# Patient Record
Sex: MALE | Race: WHITE | NOT HISPANIC OR LATINO
[De-identification: names, ages, dates, MRNs, and addresses within clinical notes are randomized per-mention and may not be internally consistent; named-entity substitution may affect disease eponyms.]

---

## 2023-07-28 PROBLEM — Z00.00 ENCOUNTER FOR PREVENTIVE HEALTH EXAMINATION: Status: ACTIVE | Noted: 2023-07-28

## 2023-09-07 ENCOUNTER — TRANSCRIPTION ENCOUNTER (OUTPATIENT)
Age: 83
End: 2023-09-07

## 2023-09-07 ENCOUNTER — APPOINTMENT (OUTPATIENT)
Dept: GASTROENTEROLOGY | Facility: CLINIC | Age: 83
End: 2023-09-07
Payer: MEDICARE

## 2023-09-07 VITALS
BODY MASS INDEX: 21.87 KG/M2 | WEIGHT: 189 LBS | DIASTOLIC BLOOD PRESSURE: 70 MMHG | HEIGHT: 78 IN | SYSTOLIC BLOOD PRESSURE: 120 MMHG

## 2023-09-07 DIAGNOSIS — K62.5 HEMORRHAGE OF ANUS AND RECTUM: ICD-10-CM

## 2023-09-07 PROCEDURE — 99203 OFFICE O/P NEW LOW 30 MIN: CPT

## 2023-09-07 RX ORDER — METOPROLOL SUCCINATE 50 MG/1
50 TABLET, EXTENDED RELEASE ORAL
Refills: 0 | Status: ACTIVE | COMMUNITY

## 2023-09-07 RX ORDER — FUROSEMIDE 40 MG/1
40 TABLET ORAL
Refills: 0 | Status: ACTIVE | COMMUNITY

## 2023-09-07 RX ORDER — DOCUSATE SODIUM 100 MG/1
100 CAPSULE, LIQUID FILLED ORAL
Refills: 0 | Status: ACTIVE | COMMUNITY

## 2023-09-07 RX ORDER — PANTOPRAZOLE SODIUM 40 MG/1
40 GRANULE, DELAYED RELEASE ORAL
Refills: 0 | Status: ACTIVE | COMMUNITY

## 2023-09-07 RX ORDER — FINASTERIDE 5 MG/1
5 TABLET, FILM COATED ORAL
Refills: 0 | Status: ACTIVE | COMMUNITY

## 2023-09-07 RX ORDER — ROSUVASTATIN CALCIUM 5 MG/1
5 TABLET, FILM COATED ORAL
Refills: 0 | Status: ACTIVE | COMMUNITY

## 2023-09-07 NOTE — ASSESSMENT
[FreeTextEntry1] : 84 yo male with history of presumed diverticular bleeding.  Last colonoscopy was in 2017.  Arrange colonoscopy at [facility] using [employer] clean out.

## 2023-09-07 NOTE — HISTORY OF PRESENT ILLNESS
[FreeTextEntry1] : Mr. JUSTO ALEJANDRA is a 83 year old male with history of recurrent bouts of painless rectal bleeding.  Patient was evaluated in 2017 after being on Xarelto for DVT.  Patient underwent urgent colonoscopy which demonstrated diverticular disease.  Since that time patient has had three subsequent episodes of painless bleeding.  Most recent blood counts are essentially unremarkable.  Symptoms resolved spontaneously.  Patient is currently off Xarelto and has an IVC filter in place.  Patient does note occasional heartburn and is on plan to resolve.

## 2024-01-03 ENCOUNTER — APPOINTMENT (OUTPATIENT)
Dept: GASTROENTEROLOGY | Facility: HOSPITAL | Age: 84
End: 2024-01-03
Payer: MEDICARE

## 2024-01-03 ENCOUNTER — OUTPATIENT (OUTPATIENT)
Dept: OUTPATIENT SERVICES | Facility: HOSPITAL | Age: 84
LOS: 1 days | Discharge: HOME | End: 2024-01-03
Payer: MEDICARE

## 2024-01-03 VITALS
HEART RATE: 60 BPM | HEIGHT: 78 IN | SYSTOLIC BLOOD PRESSURE: 146 MMHG | OXYGEN SATURATION: 100 % | WEIGHT: 190.04 LBS | RESPIRATION RATE: 21 BRPM | DIASTOLIC BLOOD PRESSURE: 82 MMHG | TEMPERATURE: 98 F

## 2024-01-03 DIAGNOSIS — Z95.828 PRESENCE OF OTHER VASCULAR IMPLANTS AND GRAFTS: Chronic | ICD-10-CM

## 2024-01-03 DIAGNOSIS — Z95.810 PRESENCE OF AUTOMATIC (IMPLANTABLE) CARDIAC DEFIBRILLATOR: Chronic | ICD-10-CM

## 2024-01-03 DIAGNOSIS — Z98.890 OTHER SPECIFIED POSTPROCEDURAL STATES: Chronic | ICD-10-CM

## 2024-01-03 DIAGNOSIS — K62.5 HEMORRHAGE OF ANUS AND RECTUM: ICD-10-CM

## 2024-01-03 PROCEDURE — 45378 DIAGNOSTIC COLONOSCOPY: CPT

## 2024-01-03 RX ORDER — METOPROLOL TARTRATE 50 MG
1 TABLET ORAL
Refills: 0 | DISCHARGE

## 2024-01-03 RX ORDER — PANTOPRAZOLE SODIUM 20 MG/1
1 TABLET, DELAYED RELEASE ORAL
Refills: 0 | DISCHARGE

## 2024-01-03 RX ORDER — FINASTERIDE 5 MG/1
1 TABLET, FILM COATED ORAL
Refills: 0 | DISCHARGE

## 2024-01-03 RX ORDER — DOCUSATE SODIUM 100 MG
1 CAPSULE ORAL
Refills: 0 | DISCHARGE

## 2024-01-03 RX ORDER — FUROSEMIDE 40 MG
1 TABLET ORAL
Refills: 0 | DISCHARGE

## 2024-01-03 RX ORDER — ROSUVASTATIN CALCIUM 5 MG/1
1 TABLET ORAL
Refills: 0 | DISCHARGE

## 2024-01-03 NOTE — ASU PATIENT PROFILE, ADULT - FALL HARM RISK - UNIVERSAL INTERVENTIONS
Bed in lowest position, wheels locked, appropriate side rails in place/Call bell, personal items and telephone in reach/Instruct patient to call for assistance before getting out of bed or chair/Non-slip footwear when patient is out of bed/[place] to call system/Physically safe environment - no spills, clutter or unnecessary equipment/Purposeful Proactive Rounding/Room/bathroom lighting operational, light cord in reach Bed in lowest position, wheels locked, appropriate side rails in place/Call bell, personal items and telephone in reach/Instruct patient to call for assistance before getting out of bed or chair/Non-slip footwear when patient is out of bed/[place] to call system/Physically safe environment - no spills, clutter or unnecessary equipment/Purposeful Proactive Rounding/Room/bathroom lighting operational, light cord in reach

## 2024-01-03 NOTE — ASU PATIENT PROFILE, ADULT - NSICDXPASTSURGICALHX_GEN_ALL_CORE_FT
PAST SURGICAL HISTORY:  AICD (automatic cardioverter/defibrillator) present     H/O inguinal hernia repair     Presence of IVC filter

## 2024-01-03 NOTE — ASU PATIENT PROFILE, ADULT - NSICDXPASTMEDICALHX_GEN_ALL_CORE_FT
PAST MEDICAL HISTORY:  Heartburn     History of BPH     History of diverticulosis     History of recurrent deep vein thrombosis (DVT)     HLD (hyperlipidemia)     HTN (hypertension)     Rectal bleeding

## 2024-01-04 PROCEDURE — 45378 DIAGNOSTIC COLONOSCOPY: CPT

## 2024-01-08 DIAGNOSIS — I25.10 ATHEROSCLEROTIC HEART DISEASE OF NATIVE CORONARY ARTERY WITHOUT ANGINA PECTORIS: ICD-10-CM

## 2024-01-08 DIAGNOSIS — K92.1 MELENA: ICD-10-CM

## 2024-01-08 DIAGNOSIS — K64.9 UNSPECIFIED HEMORRHOIDS: ICD-10-CM

## 2024-01-08 DIAGNOSIS — Z95.810 PRESENCE OF AUTOMATIC (IMPLANTABLE) CARDIAC DEFIBRILLATOR: ICD-10-CM

## 2024-01-08 DIAGNOSIS — K57.30 DIVERTICULOSIS OF LARGE INTESTINE WITHOUT PERFORATION OR ABSCESS WITHOUT BLEEDING: ICD-10-CM

## 2024-02-20 NOTE — ASU PATIENT PROFILE, ADULT - NS TRANSFER HEARING AID
Group Therapy Note    Date: 2/20/2024    Group Start Time: 1000  Group End Time: 1100  Group Topic: Recreational    [facility] Janette Díaz        Group Therapy Note    Attendees: 12       Patient's Goal:  To attend group    Notes:  Pt was attentive     Status After Intervention:  Unchanged    Participation Level: Interactive    Participation Quality: Attentive      Speech:  normal      Thought Process/Content: Logical      Affective Functioning: Congruent      Mood: euthymic      Level of consciousness:  Alert      Response to Learning: Able to verbalize current knowledge/experience      Endings: None Reported    Modes of Intervention: Activity      Discipline Responsible: Behavorial Health Tech      Signature:  Janette Monte     n/a

## 2024-05-17 ENCOUNTER — APPOINTMENT (OUTPATIENT)
Dept: ORTHOPEDIC SURGERY | Facility: CLINIC | Age: 84
End: 2024-05-17
Payer: MEDICARE

## 2024-05-17 VITALS — BODY MASS INDEX: 22.1 KG/M2 | HEIGHT: 78 IN | WEIGHT: 191 LBS

## 2024-05-17 DIAGNOSIS — M70.62 TROCHANTERIC BURSITIS, LEFT HIP: ICD-10-CM

## 2024-05-17 DIAGNOSIS — Z86.79 PERSONAL HISTORY OF OTHER DISEASES OF THE CIRCULATORY SYSTEM: ICD-10-CM

## 2024-05-17 DIAGNOSIS — Z95.0 PRESENCE OF CARDIAC PACEMAKER: ICD-10-CM

## 2024-05-17 DIAGNOSIS — Z86.718 PERSONAL HISTORY OF OTHER VENOUS THROMBOSIS AND EMBOLISM: ICD-10-CM

## 2024-05-17 DIAGNOSIS — K57.90 DIVERTICULOSIS OF INTESTINE, PART UNSPECIFIED, W/OUT PERFORATION OR ABSCESS W/OUT BLEEDING: ICD-10-CM

## 2024-05-17 DIAGNOSIS — R73.03 PREDIABETES.: ICD-10-CM

## 2024-05-17 DIAGNOSIS — I10 ESSENTIAL (PRIMARY) HYPERTENSION: ICD-10-CM

## 2024-05-17 DIAGNOSIS — M70.61 TROCHANTERIC BURSITIS, RIGHT HIP: ICD-10-CM

## 2024-05-17 DIAGNOSIS — M17.11 UNILATERAL PRIMARY OSTEOARTHRITIS, RIGHT KNEE: ICD-10-CM

## 2024-05-17 DIAGNOSIS — M17.12 UNILATERAL PRIMARY OSTEOARTHRITIS, LEFT KNEE: ICD-10-CM

## 2024-05-17 DIAGNOSIS — Z87.448 PERSONAL HISTORY OF OTHER DISEASES OF URINARY SYSTEM: ICD-10-CM

## 2024-05-17 DIAGNOSIS — E78.00 PURE HYPERCHOLESTEROLEMIA, UNSPECIFIED: ICD-10-CM

## 2024-05-17 PROBLEM — Z87.19 PERSONAL HISTORY OF OTHER DISEASES OF THE DIGESTIVE SYSTEM: Chronic | Status: ACTIVE | Noted: 2024-01-03

## 2024-05-17 PROBLEM — R12 HEARTBURN: Chronic | Status: ACTIVE | Noted: 2024-01-03

## 2024-05-17 PROBLEM — K62.5 HEMORRHAGE OF ANUS AND RECTUM: Chronic | Status: ACTIVE | Noted: 2024-01-03

## 2024-05-17 PROBLEM — E78.5 HYPERLIPIDEMIA, UNSPECIFIED: Chronic | Status: ACTIVE | Noted: 2024-01-03

## 2024-05-17 PROBLEM — Z87.438 PERSONAL HISTORY OF OTHER DISEASES OF MALE GENITAL ORGANS: Chronic | Status: ACTIVE | Noted: 2024-01-03

## 2024-05-17 PROCEDURE — 73564 X-RAY EXAM KNEE 4 OR MORE: CPT | Mod: 50

## 2024-05-17 PROCEDURE — 99203 OFFICE O/P NEW LOW 30 MIN: CPT

## 2024-05-17 PROCEDURE — 73502 X-RAY EXAM HIP UNI 2-3 VIEWS: CPT

## 2024-05-19 NOTE — HISTORY OF PRESENT ILLNESS
[Localized] : localized [Rest] : rest [Walking] : walking [] : no [FreeTextEntry5] : 85 y/o M presents for NP eval of the Rt. hip and both knees today.  Pt reports of onset of knee pain after exercising a few weeks ago. As for his hip, he sustained a fall yesterday. No prior tx. Denies any swelling. No use of NSAIDs.  [de-identified] : getting up from seated position

## 2024-05-19 NOTE — ASSESSMENT
[FreeTextEntry1] : The patient is a 83 yo man presenting with right knee and right hip pain. His right knee is the worst and is causing a limp. He has had pain for 3-4 weeks after gym exercises for the right knee. He has pain with ADLs and walking. He has pain at night and at rest. His pain is to the anterior and posterior knee. He denies instability but has pain at EROM flexion. The patient denies paresthesias. He has pain consistent with OA. He has pain with walking distances. He is on ASA 81mg and has chronic kidney issues. He can only take tylenol. The patient has had no other treatment.  The patient also fell in his home last night, landing on the right hip. His pain is all lateral to the hip without any groin pain. His pain is mild and intermittent. He has pain with using stairs and walking distances. He denies paresthesias.   Bilateral knee exam: Well appearing male in no apparent distress. No rashes, scars, or abrasions. Neurovascularly intact. Tender to palpation at the anterior and posterior knee. Ranging from 0-118 bilaterally with crepitus and pain at EROM flexion. Anterior/posterior drawer negative, - Mcmurrays. - Lachmans.   Right hip exam: The patient presents in no apparent distress. Neurovascularly intact. Sensation intact to right lower extremity. No scars, cuts or abrasions. 2+ pulses to posterior tibialis. ROM is full and painless. + abductor tenderness. - groin pain. + lateral hip tenderness. - radiculopathy. + straight leg raise. 5/5 abductor strength. - pain with forced ER/IR.  Right hip xrays taken today in office, 1 view AP pelvis and two views hip, WB: No OA noted to either hip. Hips are reduced. No obvious tumors, masses, or lesions.  Bilateral knee xrays taken today in office, 4 views WB - Moderate OA of the medial compartments and PFJ with lateral movement. No fractures or loose bodies. No obvious tumors, masses, or lesions.  The patient will go for MRI of the right knee to r/o meniscus tear. He will consider an injection as needed. He will call once study is confirmed.    Home

## 2024-05-21 ENCOUNTER — RESULT REVIEW (OUTPATIENT)
Age: 84
End: 2024-05-21

## 2024-05-23 ENCOUNTER — NON-APPOINTMENT (OUTPATIENT)
Age: 84
End: 2024-05-23

## 2024-12-31 ENCOUNTER — NON-APPOINTMENT (OUTPATIENT)
Age: 84
End: 2024-12-31
